# Patient Record
Sex: MALE | Race: WHITE | NOT HISPANIC OR LATINO | URBAN - METROPOLITAN AREA
[De-identification: names, ages, dates, MRNs, and addresses within clinical notes are randomized per-mention and may not be internally consistent; named-entity substitution may affect disease eponyms.]

---

## 2017-07-20 ENCOUNTER — EMERGENCY (EMERGENCY)
Facility: HOSPITAL | Age: 67
LOS: 1 days | End: 2017-07-20
Payer: COMMERCIAL

## 2017-07-20 PROCEDURE — 99284 EMERGENCY DEPT VISIT MOD MDM: CPT

## 2018-02-03 ENCOUNTER — EMERGENCY (EMERGENCY)
Facility: HOSPITAL | Age: 68
LOS: 1 days | End: 2018-02-03
Payer: COMMERCIAL

## 2018-02-03 PROCEDURE — 99283 EMERGENCY DEPT VISIT LOW MDM: CPT | Mod: 25

## 2018-02-03 PROCEDURE — 12001 RPR S/N/AX/GEN/TRNK 2.5CM/<: CPT

## 2023-04-03 PROBLEM — Z00.00 ENCOUNTER FOR PREVENTIVE HEALTH EXAMINATION: Status: ACTIVE | Noted: 2023-04-03

## 2023-04-13 ENCOUNTER — APPOINTMENT (OUTPATIENT)
Dept: INFECTIOUS DISEASE | Facility: CLINIC | Age: 73
End: 2023-04-13
Payer: COMMERCIAL

## 2023-04-13 VITALS
HEIGHT: 75 IN | DIASTOLIC BLOOD PRESSURE: 58 MMHG | SYSTOLIC BLOOD PRESSURE: 136 MMHG | TEMPERATURE: 98 F | WEIGHT: 247 LBS | OXYGEN SATURATION: 95 % | HEART RATE: 56 BPM | BODY MASS INDEX: 30.71 KG/M2

## 2023-04-13 DIAGNOSIS — H91.90 UNSPECIFIED HEARING LOSS, UNSPECIFIED EAR: ICD-10-CM

## 2023-04-13 DIAGNOSIS — K76.0 FATTY (CHANGE OF) LIVER, NOT ELSEWHERE CLASSIFIED: ICD-10-CM

## 2023-04-13 DIAGNOSIS — Z85.89 PERSONAL HISTORY OF MALIGNANT NEOPLASM OF OTHER ORGANS AND SYSTEMS: ICD-10-CM

## 2023-04-13 DIAGNOSIS — F32.A DEPRESSION, UNSPECIFIED: ICD-10-CM

## 2023-04-13 DIAGNOSIS — Z81.3 FAMILY HISTORY OF OTHER PSYCHOACTIVE SUBSTANCE ABUSE AND DEPENDENCE: ICD-10-CM

## 2023-04-13 DIAGNOSIS — Z77.22 CONTACT WITH AND (SUSPECTED) EXPOSURE TO ENVIRONMENTAL TOBACCO SMOKE (ACUTE) (CHRONIC): ICD-10-CM

## 2023-04-13 DIAGNOSIS — Z87.891 PERSONAL HISTORY OF NICOTINE DEPENDENCE: ICD-10-CM

## 2023-04-13 DIAGNOSIS — Z81.8 FAMILY HISTORY OF OTHER MENTAL AND BEHAVIORAL DISORDERS: ICD-10-CM

## 2023-04-13 DIAGNOSIS — M19.90 UNSPECIFIED OSTEOARTHRITIS, UNSPECIFIED SITE: ICD-10-CM

## 2023-04-13 DIAGNOSIS — Z78.9 OTHER SPECIFIED HEALTH STATUS: ICD-10-CM

## 2023-04-13 DIAGNOSIS — A49.9 BACTERIAL INFECTION, UNSPECIFIED: ICD-10-CM

## 2023-04-13 DIAGNOSIS — H60.509 UNSPECIFIED ACUTE NONINFECTIVE OTITIS EXTERNA, UNSPECIFIED EAR: ICD-10-CM

## 2023-04-13 DIAGNOSIS — Z85.828 PERSONAL HISTORY OF OTHER MALIGNANT NEOPLASM OF SKIN: ICD-10-CM

## 2023-04-13 DIAGNOSIS — Z83.3 FAMILY HISTORY OF DIABETES MELLITUS: ICD-10-CM

## 2023-04-13 DIAGNOSIS — G47.30 SLEEP APNEA, UNSPECIFIED: ICD-10-CM

## 2023-04-13 PROCEDURE — 99214 OFFICE O/P EST MOD 30 MIN: CPT

## 2023-04-13 NOTE — ASSESSMENT
[FreeTextEntry1] : This 72-year-old man with diabetes, who was admitted for otitis externa of the ear, with a polymicrobial infection.  At the hospital he was identified with Pseudomonas aeruginosa, pan susceptible.  He was discharged to home with a 7-day course of Levaquin.  Since leaving the hospital, review of the culture showed that there was also rare Klebsiella oxytoca.\par \par Clinically, he is resolved of his symptoms, and the Klebsiella oxytoca appears to be treated as well with the Levaquin. \par \par  Defer any more courses of antibiotics.\par \par \par I have advised the patient to follow-up with endocrinology.  He is trying to obtain an appointment with Dr. Hardeep Quiroga.  I advised him that it is a good idea.\par \par \par He can follow-up this office as needed.\par \par \par A copy of his cultures was provided to the patient.

## 2023-04-13 NOTE — PHYSICAL EXAM
[General Appearance - Alert] : alert [General Appearance - In No Acute Distress] : in no acute distress [Sclera] : the sclera and conjunctiva were normal [PERRL With Normal Accommodation] : pupils were equal in size, round, reactive to light [Extraocular Movements] : extraocular movements were intact [Outer Ear] : the ears and nose were normal in appearance [Oropharynx] : the oropharynx was normal with no thrush [FreeTextEntry1] : Left pinna with no significant erythema, there is no edema and no tenderness.  He has copious amount of hair on the tragus of his ear's bilaterally [Neck Appearance] : the appearance of the neck was normal [Neck Cervical Mass (___cm)] : no neck mass was observed [Thyroid Diffuse Enlargement] : the thyroid was not enlarged [Jugular Venous Distention Increased] : there was no jugular-venous distention [Auscultation Breath Sounds / Voice Sounds] : lungs were clear to auscultation bilaterally [Heart Rate And Rhythm] : heart rate was normal and rhythm regular [Heart Sounds] : normal S1 and S2 [Murmurs] : no murmurs [Heart Sounds Gallop] : no gallops [Heart Sounds Pericardial Friction Rub] : no pericardial rub [Full Pulse] : the pedal pulses are present [Edema] : there was no peripheral edema [Bowel Sounds] : normal bowel sounds [Abdomen Soft] : soft [Abdomen Tenderness] : non-tender [Abdomen Mass (___ Cm)] : no abdominal mass palpated [Costovertebral Angle Tenderness] : no CVA tenderness [No Palpable Adenopathy] : no palpable adenopathy [Musculoskeletal - Swelling] : no joint swelling [Nail Clubbing] : no clubbing  or cyanosis of the fingernails [Motor Tone] : muscle strength and tone were normal [Skin Color & Pigmentation] : normal skin color and pigmentation [] : no rash [Cranial Nerves] : cranial nerves 2-12 were intact [Sensation] : the sensory exam was normal to light touch and pinprick [No Focal Deficits] : no focal deficits [Oriented To Time, Place, And Person] : oriented to person, place, and time [Affect] : the affect was normal

## 2023-04-13 NOTE — DATA REVIEWED
[FreeTextEntry1] : \par \par \par \par \par WOUND CULTURE      \par \par Previous Result Next Result \par \par \par      Order Start Date & Time  03- 00:19  Result Date & Time    04- 08:13  \par     Ordering Clinician  RYAN NAVARRO  Result Status  Final  \par     Specimen  Wound swab  Last Updated At  [facility]  \par     Observation Date & Time  03- 18:30  Lab Number  [id number]  \par     Specimen Received Date & Time    03- 18:30  Order Number  [id number]  \par        Age at Time of Test  72 Years  \par \par \par  \par \par \par Test Item\par \par Value\par \par Comments\par \par Test Item Status\par \par Sensitivities\par \par \par  Culture - Wound \par Pseudomonas aeruginosa~Reference Lab Flag: ABNORMAL~Klebsiella oxytoca /Raoutella ornithinolytica~Reference Lab Flag: ABNORMAL~Performing Site: [facility]~[address] [place] [address]~Bill Bae MD\par Pseudomonas aeruginosa\par Numerous\par Klebsiella oxytoca\par Rare     Final   \par  ORGANISM \par Pseudomonas aeruginosa       Yes \par  ORGANISM \par Klebsiella oxytoca/Raoutella orinthinolytica         \par  \par  \par \par \par  \par  \par \par \par \par \par  \par \par Pseudomonas aeruginosa\par \par \par  \par \par Bacterial susceptibility panel by CURT\par \par \par \par Amikacin\par \par S (<= 16) \par \par \par Amoxicillin/Clavulanic Acid\par \par S (<= 8 / 4) \par \par \par Ampicillin\par \par R (> 16) \par \par \par Ampicillin/Sulbactam\par \par S ( 8 / 4) \par \par \par Aztreonam\par \par S (<= 4) \par \par \par Cefazolin\par \par R ( 16) \par \par \par Cefepime\par \par S (<= 2) \par \par \par Cefoxitin\par \par S (<= 8) \par \par \par Ceftriaxone\par \par S (<= 1) \par \par \par Ciprofloxacin\par \par S (<= 0.25) \par \par \par Ertapenem\par \par S (<= 0.5) \par \par \par Gentamicin\par \par S (<= 2) \par \par \par Imipenem\par \par S (<= 1) \par \par \par Levofloxacin\par \par S (<= 0.5) \par \par \par Meropenem\par \par S (<= 1) \par \par \par Piperacillin/Tazobactam\par \par S (<= 8) \par \par \par Tobramycin\par \par S (<= 2) \par \par \par Trimethoprim/Sulfamethoxazole\par \par S (<= 0.5 / 9.5) \par \par  \par     \par Test Set Comments:\par Pus from left ear canal (recent surgery)\par Numerous Pseudomonas aeruginosa Rare Klebsiella oxytoca/Raoultella\par ornithinolytica\par Pus from left ear canal (recent surgery)   \par

## 2023-04-13 NOTE — HISTORY OF PRESENT ILLNESS
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  He was admitted to the hospital  on March 28, 2023 due to concern of possible malignant otitis externa.\par \par His labs show mild WBC elevation.  Cultures from the ear grew out Pseudomonas aeruginosa, pan susceptible.  Labs from the hospital showed that he had a A1c was 7.7. \par \par He was recommended to be discharged home with Levaquin 750 mg p.o. daily for 7 days.\par \par Cultures were reviewed and printed out at the office today.  Final results from the culture also grew out Klebsiella oxytoca.\par \par Clinically, he feels well.  He has no more ear pain.  There is no drainage.  He had follow-up with the surgeon who did the surgery from his ear, and everything looks well.

## 2023-04-18 PROBLEM — Z85.89 HISTORY OF SQUAMOUS CELL CARCINOMA: Status: ACTIVE | Noted: 2023-04-13

## 2023-04-18 PROBLEM — K76.0 FATTY LIVER: Status: ACTIVE | Noted: 2023-04-13

## 2023-04-18 PROBLEM — G47.30 SLEEP APNEA, UNSPECIFIED TYPE: Status: ACTIVE | Noted: 2023-04-13

## 2023-04-18 PROBLEM — F32.A DEPRESSION: Status: ACTIVE | Noted: 2023-04-13

## 2023-04-18 PROBLEM — M19.90 ARTHRITIS: Status: ACTIVE | Noted: 2023-04-13

## 2023-04-18 PROBLEM — Z87.891 FORMER SMOKER: Status: ACTIVE | Noted: 2023-04-13

## 2023-04-18 PROBLEM — Z78.9 CONSUMES ALCOHOL OCCASIONALLY: Status: ACTIVE | Noted: 2023-04-18

## 2023-04-18 PROBLEM — H91.90 HOH (HARD OF HEARING): Status: ACTIVE | Noted: 2023-04-13

## 2023-04-18 PROBLEM — Z85.828 HISTORY OF BASAL CELL CARCINOMA: Status: ACTIVE | Noted: 2023-04-13

## 2023-04-18 PROBLEM — Z77.22 SECONDHAND SMOKE EXPOSURE: Status: ACTIVE | Noted: 2023-04-18

## 2023-04-18 PROBLEM — Z83.3 FAMILY HISTORY OF DIABETES MELLITUS: Status: ACTIVE | Noted: 2023-04-18

## 2023-04-18 RX ORDER — CARVEDILOL 12.5 MG/1
12.5 TABLET, FILM COATED ORAL TWICE DAILY
Refills: 0 | Status: ACTIVE | COMMUNITY

## 2023-04-18 RX ORDER — FAMOTIDINE 10 MG/1
TABLET, FILM COATED ORAL DAILY
Refills: 0 | Status: ACTIVE | COMMUNITY

## 2023-04-18 RX ORDER — AMLODIPINE BESYLATE 5 MG/1
5 TABLET ORAL DAILY
Refills: 0 | Status: ACTIVE | COMMUNITY

## 2023-04-18 RX ORDER — PAROXETINE HYDROCHLORIDE 40 MG/1
40 TABLET, FILM COATED ORAL DAILY
Refills: 0 | Status: ACTIVE | COMMUNITY

## 2023-04-18 RX ORDER — CYCLOBENZAPRINE HYDROCHLORIDE 10 MG/1
10 TABLET, FILM COATED ORAL
Refills: 0 | Status: ACTIVE | COMMUNITY

## 2023-04-18 RX ORDER — ASCORBIC ACID 500 MG
TABLET ORAL
Refills: 0 | Status: ACTIVE | COMMUNITY

## 2023-04-18 RX ORDER — ATORVASTATIN CALCIUM 20 MG/1
20 TABLET, FILM COATED ORAL DAILY
Refills: 0 | Status: ACTIVE | COMMUNITY

## 2023-04-18 RX ORDER — FENOFIBRATE 145 MG/1
145 TABLET, COATED ORAL DAILY
Refills: 0 | Status: ACTIVE | COMMUNITY

## 2023-04-18 RX ORDER — LACTOBACILLUS RHAMNOSUS GG 10B CELL
CAPSULE ORAL
Refills: 0 | Status: ACTIVE | COMMUNITY

## 2023-04-18 RX ORDER — ICOSAPENT ETHYL 1000 MG/1
1 CAPSULE ORAL
Refills: 0 | Status: ACTIVE | COMMUNITY

## 2023-04-18 RX ORDER — FLUTICASONE PROPIONATE 50 MCG
SPRAY, SUSPENSION NASAL 3 TIMES DAILY
Refills: 0 | Status: ACTIVE | COMMUNITY

## 2023-04-18 RX ORDER — VITAMIN E ACID SUCCINATE 268 MG
TABLET ORAL
Refills: 0 | Status: ACTIVE | COMMUNITY

## 2023-05-03 ENCOUNTER — APPOINTMENT (OUTPATIENT)
Dept: INFECTIOUS DISEASE | Facility: CLINIC | Age: 73
End: 2023-05-03
Payer: COMMERCIAL

## 2023-05-03 VITALS
DIASTOLIC BLOOD PRESSURE: 60 MMHG | HEART RATE: 54 BPM | BODY MASS INDEX: 31.58 KG/M2 | TEMPERATURE: 98.3 F | WEIGHT: 254 LBS | HEIGHT: 75 IN | SYSTOLIC BLOOD PRESSURE: 140 MMHG | OXYGEN SATURATION: 98 %

## 2023-05-03 DIAGNOSIS — A49.8 OTHER BACTERIAL INFECTIONS OF UNSPECIFIED SITE: ICD-10-CM

## 2023-05-03 DIAGNOSIS — H60.90 UNSPECIFIED OTITIS EXTERNA, UNSPECIFIED EAR: ICD-10-CM

## 2023-05-03 PROCEDURE — 99215 OFFICE O/P EST HI 40 MIN: CPT | Mod: 25

## 2023-05-03 NOTE — HISTORY OF PRESENT ILLNESS
[FreeTextEntry1] : Presents today saying that he is having increasing discharge and pain from his ear with pus coming out and sensitivity in the area.  He is not having any fevers or chills.  Today [birth year] his birthday.  He says after finishing the Levaquin he went to the [facility] to see his internist and his internist put him on Augmentin which she took for 2 weeks without any clinical improvement.  So he is here now to follow-up.  I discussed with him that his prior cultures had Pseudomonas growth from them and that Augmentin would not be sufficient to treat that we will restart Levaquin and I reswabbed the drainage for culture.\par \par Seen by Dr Joshua  in office April 13 his note as below \par  72-year-old man with diabetes, that is in poor control, with glucose range from 80s to 200, who was last seen at the hospital March 31, 2023.\par He had a recent excision of skin cancer from his left ear, then developed increasing ear pain difficulty hearing, and drainage from his ear. He was admitted to the hospital on March 28, 2023 due to concern of possible malignant otitis externa.\par \par His labs show mild WBC elevation. Cultures from the ear grew out Pseudomonas aeruginosa, pan susceptible. Labs from the hospital showed that he had a A1c was 7.7. \par \par He was recommended to be discharged home with Levaquin 750 mg p.o. daily for 7 days.\par \par Cultures were reviewed and printed out at the office today. Final results from the culture also grew out Klebsiella oxytoca.\par \par Clinically, he feels well. He has no more ear pain. There is no drainage. He had follow-up with the surgeon who did the surgery from his ear, and everything looks well.

## 2023-05-03 NOTE — DATA REVIEWED
[FreeTextEntry1] : \par WOUND CULTURE \par \par Previous Result Next Result \par \par \par  Order Start Date & Time 03- 00:19 Result Date & Time 04- 08:13 \par  Ordering Clinician RYAN NAVARRO Result Status Final \par  Specimen Wound swab Last Updated At [facility] \par  Observation Date & Time 03- 18:30 Lab Number [id number] \par  Specimen Received Date & Time 03- 18:30 Order Number [id number] \par  Age at Time of Test 72 Years \par \par \par  \par \par \par Test Item\par \par Value\par \par Comments\par \par Test Item Status\par \par Sensitivities\par \par \par  Culture - Wound \par Pseudomonas aeruginosaeference Lab Flag: ABNORMAL~Klebsiella oxytoca /Raoutella ornithinolyticaeference Lab Flag: ABNORMALerforming Site: [facility]~[address] [place] [address]~Bill Bae MD\par Pseudomonas aeruginosa\par Numerous\par Klebsiella oxytoca\par Rare Final \par  ORGANISM \par Pseudomonas aeruginosa Yes \par  ORGANISM \par Klebsiella oxytoca/Raoutella orinthinolytica \par  \par  \par \par \par  \par  \par \par \par \par \par  \par \par Pseudomonas aeruginosa\par \par \par  \par \par Bacterial susceptibility panel by CURT\par \par \par \par Amikacin\par \par S (<= 16) \par \par \par Amoxicillin/Clavulanic Acid\par \par S (<= 8 / 4) \par \par \par Ampicillin\par \par R (> 16) \par \par \par Ampicillin/Sulbactam\par \par S ( 8 / 4) \par \par \par Aztreonam\par \par S (<= 4) \par \par \par Cefazolin\par \par R ( 16) \par \par \par Cefepime\par \par S (<= 2) \par \par \par Cefoxitin\par \par S (<= 8) \par \par \par Ceftriaxone\par \par S (<= 1) \par \par \par Ciprofloxacin\par \par S (<= 0.25) \par \par \par Ertapenem\par \par S (<= 0.5) \par \par \par Gentamicin\par \par S (<= 2) \par \par \par Imipenem\par \par S (<= 1) \par \par \par Levofloxacin\par \par S (<= 0.5) \par \par \par Meropenem\par \par S (<= 1) \par \par \par Piperacillin/Tazobactam\par \par S (<= 8) \par \par \par Tobramycin\par \par S (<= 2) \par \par \par Trimethoprim/Sulfamethoxazole\par \par S (<= 0.5 / 9.5) \par \par  \par  \par Test Set Comments:\par Pus from left ear canal (recent surgery)\par Numerous Pseudomonas aeruginosa Rare Klebsiella oxytoca/Raoultella\par ornithinolytica\par Pus from left ear canal (recent surgery) \par . \par \par

## 2023-05-03 NOTE — ASSESSMENT
[FreeTextEntry1] : 72-year-old-year-old male with diabetes and recent ear surgery for a carcinoma complicated by otitis externa with Pseudomonas aeruginosa and Klebsiella oxytoca completed a course of treatment 7 days with levofloxacin and then subsequently went to his internist who prescribed 2 weeks of Augmentin.  He had no clinical improvement on the Augmentin and has actively draining pus from the ear with tenderness and pain.\par \par #Otitis externa Pseudomonas aeruginosa and Klebsiella oxytoca, recurrent\par – Collected an office culture of active pus drainage will send to lab\par – Ordered levofloxacin 750 daily for 10-day course he should follow-up in office next week to assess if there is any improvement if there is not further imaging may be needed due to the pain and tenderness in that area\par \par #Diabetes\par -Optimize blood sugar control to aid in healing [Treatment Education] : treatment education [Treatment Adherence] : treatment adherence [Medical Care Issues] : medical care issues [Drug Interactions / Side Effects] : drug interactions/side effects [Disclosure of Diagnosis] : disclosure of diagnosis [Anticipatory Guidance] : anticipatory guidance

## 2023-05-03 NOTE — PHYSICAL EXAM
[General Appearance - Alert] : alert [General Appearance - In No Acute Distress] : in no acute distress [Extraocular Movements] : extraocular movements were intact [FreeTextEntry1] : L ear with outward growth and active pus drainage, tender to touch, mild palpable LN [Edema] : there was no peripheral edema [Abdomen Soft] : soft [Musculoskeletal - Swelling] : no joint swelling [Skin Color & Pigmentation] : normal skin color and pigmentation [] : no rash [No Focal Deficits] : no focal deficits

## 2023-05-10 ENCOUNTER — APPOINTMENT (OUTPATIENT)
Dept: INFECTIOUS DISEASE | Facility: CLINIC | Age: 73
End: 2023-05-10
Payer: COMMERCIAL

## 2023-05-10 VITALS
DIASTOLIC BLOOD PRESSURE: 62 MMHG | TEMPERATURE: 98 F | BODY MASS INDEX: 30.84 KG/M2 | HEART RATE: 61 BPM | WEIGHT: 248 LBS | HEIGHT: 75 IN | OXYGEN SATURATION: 96 % | RESPIRATION RATE: 18 BRPM | SYSTOLIC BLOOD PRESSURE: 130 MMHG

## 2023-05-10 PROCEDURE — 99215 OFFICE O/P EST HI 40 MIN: CPT

## 2023-05-10 NOTE — PHYSICAL EXAM
[General Appearance - Alert] : alert [General Appearance - In No Acute Distress] : in no acute distress [Extraocular Movements] : extraocular movements were intact [FreeTextEntry1] : L ear with outward growth and minimal pus, tender to touch less than prior but still pain with pinna manipulation, mild palpable LN [Edema] : there was no peripheral edema [Abdomen Soft] : soft [Musculoskeletal - Swelling] : no joint swelling [Skin Color & Pigmentation] : normal skin color and pigmentation [] : no rash [No Focal Deficits] : no focal deficits

## 2023-05-10 NOTE — HISTORY OF PRESENT ILLNESS
[FreeTextEntry1] : Seen today.  Remains on levofloxacin, does not describe any side effects from this.  He says the ear feels better there is still drainage but it has decreased and the pain and sensitivity he was feeling has also decreased but is not yet resolved he still feels some.  No fevers no chills no muscle aches no tendon pain.  There is still pain to palpation behind the ear over the bone, discussed with him we will increase antibiotic duration due to this.  He was in agreement.\par \par Last seen 5/3/23 note from prior visit:\par Said that he is having increasing discharge and pain from his ear with pus coming out and sensitivity in the area.  He is not having any fevers or chills.  Today is his birthday.  He says after finishing the Levaquin he went to the [facility] to see his internist and his internist put him on Augmentin which she took for 2 weeks without any clinical improvement.  So he is here now to follow-up.  I discussed with him that his prior cultures had Pseudomonas growth from them and that Augmentin would not be sufficient to treat that we will restart Levaquin and I reswabbed the drainage for culture.\par \par Seen by Dr Joshua  in office April 13 his note as below \par  72-year-old man with diabetes, that is in poor control, with glucose range from 80s to 200, who was last seen at the hospital March 31, 2023.\par He had a recent excision of skin cancer from his left ear, then developed increasing ear pain difficulty hearing, and drainage from his ear. He was admitted to the hospital on March 28, 2023 due to concern of possible malignant otitis externa.\par \par His labs show mild WBC elevation. Cultures from the ear grew out Pseudomonas aeruginosa, pan susceptible. Labs from the hospital showed that he had a A1c was 7.7. \par \par He was recommended to be discharged home with Levaquin 750 mg p.o. daily for 7 days.\par \par Cultures were reviewed and printed out at the office today. Final results from the culture also grew out Klebsiella oxytoca.\par \par Clinically, he feels well. He has no more ear pain. There is no drainage. He had follow-up with the surgeon who did the surgery from his ear, and everything looks well.

## 2023-05-10 NOTE — DATA REVIEWED
[FreeTextEntry1] : 5/3/23 Ear culture\par Few Pseudomonas aeruginosa along with normal respiratory chip susceptibilities of Pseudomonas was susceptible to all tested antibiotics including levofloxacin and ciprofloxacin

## 2023-05-10 NOTE — REVIEW OF SYSTEMS
[Fever] : no fever [Chills] : no chills [Earache] : earache [Palpitations] : no palpitations [Shortness Of Breath] : no shortness of breath [Abdominal Pain] : no abdominal pain [Dysuria] : no dysuria [Joint Pain] : no joint pain [Skin Lesions] : no skin lesions [Confused] : no confusion [FreeTextEntry4] : ear discharge

## 2023-05-10 NOTE — ASSESSMENT
[FreeTextEntry1] : 72-year-old-year-old male with diabetes and recent ear surgery for a carcinoma complicated by otitis externa with Pseudomonas aeruginosa and Klebsiella oxytoca completed a course of treatment 7 days with levofloxacin and then subsequently went to his internist who prescribed 2 weeks of Augmentin. He had no clinical improvement on the Augmentin and had actively draining pus from the ear with tenderness and pain, repeat cultures 5/3/2023 collected in office grew Pseudomonas aeruginosa pan susceptible along with normal respiratory chip.  He was restarted on levofloxacin on 5 3 and remains on this, discharge from ear has decreased but not resolved and pain and tenderness around the ear has decreased but not yet resolved.  Due to this will extend antibiotic course.\par \par #Otitis externa Pseudomonas aeruginosa and Klebsiella oxytoca, recurrent\par \par –Extend course of levofloxacin 750 daily for additional 7 days, initially prescribed for 5/3-5/12/23, now now will be extended by a week  he should follow-up in office in 2 weeks to ensure resolution if there is not further imaging may be needed due to the pain and tenderness in that area\par -Discussed with him levofloxacin can cause tendinitis tendon rupture and muscle pain, and that if he develops any tendon pain or muscle pain he should contact the office immediately, discussed with him that fluoroquinolones are the only oral antibiotic choice for Pseudomonas so that is why we have to use it if he develops the symptoms we will need to treat with IV antibiotics\par \par \par #Diabetes\par -Optimize blood sugar control to aid in healing. \par \par Patient requested primary care referral as he has recently moved here from [place] referrals provided by [facility]\par \par  [Treatment Education] : treatment education [Treatment Adherence] : treatment adherence [Medical Care Issues] : medical care issues [Drug Interactions / Side Effects] : drug interactions/side effects [Disclosure of Diagnosis] : disclosure of diagnosis

## 2023-05-17 LAB — EAR NOSE AND THROAT CULTURE: ABNORMAL

## 2023-05-31 ENCOUNTER — APPOINTMENT (OUTPATIENT)
Dept: INFECTIOUS DISEASE | Facility: CLINIC | Age: 73
End: 2023-05-31

## 2023-07-13 ENCOUNTER — APPOINTMENT (OUTPATIENT)
Dept: INFECTIOUS DISEASE | Facility: CLINIC | Age: 73
End: 2023-07-13
Payer: COMMERCIAL

## 2023-07-13 VITALS
HEART RATE: 50 BPM | TEMPERATURE: 97.9 F | DIASTOLIC BLOOD PRESSURE: 68 MMHG | OXYGEN SATURATION: 99 % | WEIGHT: 242 LBS | SYSTOLIC BLOOD PRESSURE: 140 MMHG | HEIGHT: 75 IN | BODY MASS INDEX: 30.09 KG/M2

## 2023-07-13 DIAGNOSIS — L53.9 ERYTHEMATOUS CONDITION, UNSPECIFIED: ICD-10-CM

## 2023-07-13 DIAGNOSIS — E11.9 TYPE 2 DIABETES MELLITUS W/OUT COMPLICATIONS: ICD-10-CM

## 2023-07-13 PROCEDURE — 99213 OFFICE O/P EST LOW 20 MIN: CPT

## 2023-07-13 RX ORDER — LEVOFLOXACIN 750 MG/1
750 TABLET, FILM COATED ORAL DAILY
Qty: 7 | Refills: 0 | Status: DISCONTINUED | COMMUNITY
Start: 2023-05-10 | End: 2023-07-13

## 2023-07-13 RX ORDER — LEVOFLOXACIN 750 MG/1
750 TABLET, FILM COATED ORAL DAILY
Qty: 10 | Refills: 0 | Status: DISCONTINUED | COMMUNITY
Start: 2023-05-03 | End: 2023-07-13

## 2023-07-13 NOTE — HISTORY OF PRESENT ILLNESS
[FreeTextEntry1] : This 73-year-old gentleman with diabetes, who had been seen in April for a left ear infection/otitis externa which grew out susceptible Pseudomonas and also Klebsiella oxytoca..  He was treated with course of IV antibiotics followed by oral Levaquin and discharged to the community.\par \par He had a follow-up this office with Dr. Pop on May 3, 2023.  This was because after the hospital course he went to his internist for drainage of pus from his ear, who prescribed 2 weeks of Augmentin with no improvement and he was still draining pus.  At that appointment he was given 10 more days of Levaquin 750 mg daily.  A culture sent from the office marked erroneously as ear nose and throat culture, grew out Pseudomonas aeruginosa susceptible to all tested agents.  The patient had another follow-up 1 week later on May 10, 2023, his course of antibiotics was extended by another 7 days.\par \par \par Today he is here for follow-up.  He reports that he went to his ENT doctor, concern for some irritation in his left outer ear.  The ENT examined it, and told that he has some scar tissue.  He had been applying bacitracin to the ear since the May visit.\par

## 2023-07-13 NOTE — ASSESSMENT
[FreeTextEntry1] : This 73-year-old man who has diabetes, been treated with a prolonged course of antibiotics for Pseudomonas infection of the ear.  Last treatment course completed around middle of May 2023.\par \par He is here for redness and irritation of the left ear, specifically the dorota area.\par \par Examination suggest that this might be irritation and inflammation from the use of the bacitracin.\par \par No evidence of deeper infection.  No pus drainage.\par \par I suggest for the patient to stop use of bacitracin immediately.\par \par He can follow-up closely with his primary care doctor and his ENT doctor.  He can follow-up this office as needed.

## 2023-07-13 NOTE — PHYSICAL EXAM
[General Appearance - Alert] : alert [General Appearance - In No Acute Distress] : in no acute distress [Sclera] : the sclera and conjunctiva were normal [PERRL With Normal Accommodation] : pupils were equal in size, round, reactive to light [Extraocular Movements] : extraocular movements were intact [Outer Ear] : the ears and nose were normal in appearance [Oropharynx] : the oropharynx was normal with no thrush [FreeTextEntry1] : Left pinna with no significant erythema, there is no tenderness on traction of the earlobe.  There is erythema and flaking of the skin in the dorota of the left ear. [Neck Appearance] : the appearance of the neck was normal [Neck Cervical Mass (___cm)] : no neck mass was observed [Jugular Venous Distention Increased] : there was no jugular-venous distention [Thyroid Diffuse Enlargement] : the thyroid was not enlarged [Auscultation Breath Sounds / Voice Sounds] : lungs were clear to auscultation bilaterally [Heart Rate And Rhythm] : heart rate was normal and rhythm regular [Heart Sounds] : normal S1 and S2 [Heart Sounds Gallop] : no gallops [Murmurs] : no murmurs [Heart Sounds Pericardial Friction Rub] : no pericardial rub [Full Pulse] : the pedal pulses are present [Edema] : there was no peripheral edema [Bowel Sounds] : normal bowel sounds [Abdomen Soft] : soft [Abdomen Tenderness] : non-tender [Abdomen Mass (___ Cm)] : no abdominal mass palpated [Costovertebral Angle Tenderness] : no CVA tenderness [No Palpable Adenopathy] : no palpable adenopathy [Musculoskeletal - Swelling] : no joint swelling [Nail Clubbing] : no clubbing  or cyanosis of the fingernails [Motor Tone] : muscle strength and tone were normal [Skin Color & Pigmentation] : normal skin color and pigmentation [] : no rash [Cranial Nerves] : cranial nerves 2-12 were intact [Sensation] : the sensory exam was normal to light touch and pinprick [No Focal Deficits] : no focal deficits [Oriented To Time, Place, And Person] : oriented to person, place, and time [Affect] : the affect was normal

## 2023-09-11 ENCOUNTER — APPOINTMENT (OUTPATIENT)
Dept: ENDOCRINOLOGY | Facility: CLINIC | Age: 73
End: 2023-09-11
Payer: COMMERCIAL

## 2023-09-11 VITALS
OXYGEN SATURATION: 98 % | HEIGHT: 74 IN | HEART RATE: 60 BPM | RESPIRATION RATE: 18 BRPM | WEIGHT: 245 LBS | DIASTOLIC BLOOD PRESSURE: 70 MMHG | SYSTOLIC BLOOD PRESSURE: 140 MMHG | BODY MASS INDEX: 31.44 KG/M2 | TEMPERATURE: 97.4 F

## 2023-09-11 LAB — GLUCOSE BLDC GLUCOMTR-MCNC: 108

## 2023-09-11 PROCEDURE — 99214 OFFICE O/P EST MOD 30 MIN: CPT | Mod: 25

## 2023-09-11 PROCEDURE — 82962 GLUCOSE BLOOD TEST: CPT

## 2023-09-11 RX ORDER — INSULIN GLARGINE 100 [IU]/ML
INJECTION, SOLUTION SUBCUTANEOUS
Refills: 0 | Status: DISCONTINUED | COMMUNITY
End: 2023-09-11

## 2023-09-11 RX ORDER — INSULIN LISPRO 100 [IU]/ML
INJECTION, SOLUTION INTRAVENOUS; SUBCUTANEOUS
Refills: 0 | Status: DISCONTINUED | COMMUNITY
End: 2023-09-11

## 2023-09-11 RX ORDER — LEVOTHYROXINE SODIUM 0.15 MG/1
150 TABLET ORAL
Qty: 90 | Refills: 1 | Status: ACTIVE | COMMUNITY
Start: 1900-01-01 | End: 1900-01-01

## 2023-09-11 RX ORDER — LEVOTHYROXINE SODIUM 100 UG/ML
INJECTION, SOLUTION INTRAVENOUS
Refills: 0 | Status: DISCONTINUED | COMMUNITY
End: 2023-09-11

## 2023-09-21 RX ORDER — BLOOD-GLUCOSE,RECEIVER,CONT
EACH MISCELLANEOUS
Qty: 1 | Refills: 0 | Status: ACTIVE | COMMUNITY
Start: 2023-09-21 | End: 1900-01-01

## 2023-10-19 RX ORDER — SEMAGLUTIDE 0.68 MG/ML
2 INJECTION, SOLUTION SUBCUTANEOUS
Qty: 3 | Refills: 1 | Status: ACTIVE | COMMUNITY
Start: 2023-09-11

## 2023-11-01 ENCOUNTER — APPOINTMENT (OUTPATIENT)
Dept: ENDOCRINOLOGY | Facility: CLINIC | Age: 73
End: 2023-11-01
Payer: COMMERCIAL

## 2023-11-01 PROCEDURE — ZZZZZ: CPT

## 2023-11-20 ENCOUNTER — NON-APPOINTMENT (OUTPATIENT)
Age: 73
End: 2023-11-20

## 2024-02-14 ENCOUNTER — APPOINTMENT (OUTPATIENT)
Dept: ENDOCRINOLOGY | Facility: CLINIC | Age: 74
End: 2024-02-14
Payer: COMMERCIAL

## 2024-02-14 VITALS
RESPIRATION RATE: 18 BRPM | OXYGEN SATURATION: 99 % | TEMPERATURE: 97 F | SYSTOLIC BLOOD PRESSURE: 140 MMHG | DIASTOLIC BLOOD PRESSURE: 70 MMHG | BODY MASS INDEX: 30.16 KG/M2 | HEIGHT: 74 IN | HEART RATE: 60 BPM | WEIGHT: 235 LBS

## 2024-02-14 DIAGNOSIS — E03.9 HYPOTHYROIDISM, UNSPECIFIED: ICD-10-CM

## 2024-02-14 DIAGNOSIS — E11.65 TYPE 2 DIABETES MELLITUS WITH HYPERGLYCEMIA: ICD-10-CM

## 2024-02-14 DIAGNOSIS — E78.5 HYPERLIPIDEMIA, UNSPECIFIED: ICD-10-CM

## 2024-02-14 DIAGNOSIS — I10 ESSENTIAL (PRIMARY) HYPERTENSION: ICD-10-CM

## 2024-02-14 PROCEDURE — 95251 CONT GLUC MNTR ANALYSIS I&R: CPT

## 2024-02-14 PROCEDURE — 99214 OFFICE O/P EST MOD 30 MIN: CPT

## 2024-02-14 RX ORDER — PEN NEEDLE, DIABETIC 32GX 5/32"
32G X 4 MM NEEDLE, DISPOSABLE MISCELLANEOUS
Qty: 400 | Refills: 3 | Status: ACTIVE | COMMUNITY
Start: 2024-02-14 | End: 1900-01-01

## 2024-02-14 NOTE — HISTORY OF PRESENT ILLNESS
[FreeTextEntry1] : Follow up of DM and hypothyroidism  Was seen at [facility] during admission in March 2023 when he was admitted with left ear infection, which occurred after resection of skin cancer, depression, HT, HLD. Patient is a [place] .   Quality:  Type 2 DM Severity:  moderate Duration of diabetes:  about 40 years Associated Complications/ Symptoms: Neuropathy Modifying Factors:  Better with medication  SMBG: Testing up to 4 times a day. Currently using Dexcom G7 CGM:  Average BG is 169 mg/dl with SD of 52.  64% of BG within target range, 0% below range and 36% above range.    Current Diabetic Medication Regimen: Lantus 100 units qhs Humalog 20 units with meals  Jardiance 10 mg daily Did not yet start Ozempic.   Was intolerant to metformin in the past.    Has lost some weight.

## 2024-02-14 NOTE — ASSESSMENT
[FreeTextEntry1] : 73 year old male with Type 2 DM complicated by neuropathy, HTN, HLD and hypothyroidism here for follow up of his diabetes.  His diabetes control is improving.     1. Type 2 DM- check labs now.  Will start Ozempic 0.25 mg qweek for 4 weeks, then increase to 0.5 mg qweek.  When starting Ozempic, will reduce lantus to 80 units qhs and reduce Humalog to 10 units 3XD with meals.    CGM medical necessity statement: Patient tests his blood glucose 4 or more times a day and injects insulin 3 or more times per day. He is seen regularly at this office within 6 month intervals. Patient requires frequent adjustments to his insulin regimen on the basis of CGM results.  2. Hypothyroidism- continue LT4. Check TFTs now. 3. HLD- continue statin, fibrate and Vascepa. Check lipids now.  Will determine if Fenofibrate is still necessary based on results.   4. HTN- would benefit from addition of ARB in the future.  Follow up in 3 months.

## 2024-03-22 RX ORDER — BLOOD-GLUCOSE SENSOR
EACH MISCELLANEOUS
Qty: 9 | Refills: 1 | Status: ACTIVE | COMMUNITY
Start: 2023-09-21 | End: 1900-01-01

## 2024-03-27 ENCOUNTER — LABORATORY RESULT (OUTPATIENT)
Age: 74
End: 2024-03-27

## 2024-03-28 RX ORDER — BLOOD-GLUCOSE METER
W/DEVICE EACH MISCELLANEOUS
Qty: 1 | Refills: 0 | Status: ACTIVE | COMMUNITY
Start: 2024-03-28 | End: 1900-01-01

## 2024-03-28 RX ORDER — LANCETS 30 GAUGE
EACH MISCELLANEOUS
Qty: 100 | Refills: 3 | Status: ACTIVE | COMMUNITY
Start: 2024-03-28 | End: 1900-01-01

## 2024-03-28 RX ORDER — BLOOD SUGAR DIAGNOSTIC
STRIP MISCELLANEOUS
Qty: 100 | Refills: 1 | Status: ACTIVE | COMMUNITY
Start: 2024-03-28 | End: 1900-01-01

## 2024-04-02 LAB
ALBUMIN SERPL ELPH-MCNC: 4.9 G/DL
ALP BLD-CCNC: 53 U/L
ALT SERPL-CCNC: 28 U/L
ANION GAP SERPL CALC-SCNC: 13 MMOL/L
AST SERPL-CCNC: 23 U/L
BASOPHILS # BLD AUTO: 0.09 K/UL
BASOPHILS NFR BLD AUTO: 1.1 %
BILIRUB SERPL-MCNC: 0.5 MG/DL
BUN SERPL-MCNC: 16 MG/DL
C PEPTIDE SERPL-MCNC: 0.8 NG/ML
CALCIUM SERPL-MCNC: 10 MG/DL
CHLORIDE SERPL-SCNC: 103 MMOL/L
CHOLEST SERPL-MCNC: 166 MG/DL
CO2 SERPL-SCNC: 26 MMOL/L
CREAT SERPL-MCNC: 0.84 MG/DL
CREAT SPEC-SCNC: 86 MG/DL
EGFR: 92 ML/MIN/1.73M2
EOSINOPHIL # BLD AUTO: 0.42 K/UL
EOSINOPHIL NFR BLD AUTO: 5.1 %
ESTIMATED AVERAGE GLUCOSE: 174 MG/DL
GLUCOSE SERPL-MCNC: 156 MG/DL
HBA1C MFR BLD HPLC: 7.7 %
HCT VFR BLD CALC: 40.6 %
HDLC SERPL-MCNC: 33 MG/DL
HGB BLD-MCNC: 12.8 G/DL
IMM GRANULOCYTES NFR BLD AUTO: 0.2 %
LDLC SERPL CALC-MCNC: 83 MG/DL
LYMPHOCYTES # BLD AUTO: 2.74 K/UL
LYMPHOCYTES NFR BLD AUTO: 33.5 %
MAN DIFF?: NORMAL
MCHC RBC-ENTMCNC: 21.4 PG
MCHC RBC-ENTMCNC: 31.5 GM/DL
MCV RBC AUTO: 67.8 FL
MICROALBUMIN 24H UR DL<=1MG/L-MCNC: <1.2 MG/DL
MICROALBUMIN/CREAT 24H UR-RTO: NORMAL MG/G
MONOCYTES # BLD AUTO: 0.52 K/UL
MONOCYTES NFR BLD AUTO: 6.3 %
NEUTROPHILS # BLD AUTO: 4.4 K/UL
NEUTROPHILS NFR BLD AUTO: 53.8 %
NONHDLC SERPL-MCNC: 133 MG/DL
PLATELET # BLD AUTO: 339 K/UL
POTASSIUM SERPL-SCNC: 4.3 MMOL/L
PROT SERPL-MCNC: 8.1 G/DL
RBC # BLD: 5.99 M/UL
RBC # FLD: 17.4 %
SODIUM SERPL-SCNC: 141 MMOL/L
TRIGL SERPL-MCNC: 308 MG/DL
TSH SERPL-ACNC: 1.65 UIU/ML
WBC # FLD AUTO: 8.19 K/UL

## 2024-04-15 RX ORDER — INSULIN LISPRO 200 [IU]/ML
200 INJECTION, SOLUTION SUBCUTANEOUS
Qty: 2 | Refills: 1 | Status: ACTIVE | COMMUNITY
Start: 1900-01-01 | End: 1900-01-01

## 2024-04-15 RX ORDER — INSULIN GLARGINE 100 [IU]/ML
100 INJECTION, SOLUTION SUBCUTANEOUS
Qty: 5 | Refills: 1 | Status: ACTIVE | COMMUNITY
Start: 1900-01-01 | End: 1900-01-01

## 2024-04-15 RX ORDER — EMPAGLIFLOZIN 25 MG/1
25 TABLET, FILM COATED ORAL
Qty: 90 | Refills: 1 | Status: ACTIVE | COMMUNITY
Start: 1900-01-01 | End: 1900-01-01

## 2024-04-25 ENCOUNTER — APPOINTMENT (OUTPATIENT)
Dept: DERMATOLOGY | Facility: CLINIC | Age: 74
End: 2024-04-25

## 2024-08-22 ENCOUNTER — APPOINTMENT (OUTPATIENT)
Dept: INFECTIOUS DISEASE | Facility: CLINIC | Age: 74
End: 2024-08-22
Payer: COMMERCIAL

## 2024-08-22 VITALS
DIASTOLIC BLOOD PRESSURE: 60 MMHG | BODY MASS INDEX: 29.52 KG/M2 | HEART RATE: 72 BPM | HEIGHT: 74 IN | TEMPERATURE: 98.3 F | SYSTOLIC BLOOD PRESSURE: 128 MMHG | OXYGEN SATURATION: 95 % | WEIGHT: 230 LBS

## 2024-08-22 DIAGNOSIS — R50.9 FEVER, UNSPECIFIED: ICD-10-CM

## 2024-08-22 DIAGNOSIS — R53.82 CHRONIC FATIGUE, UNSPECIFIED: ICD-10-CM

## 2024-08-22 DIAGNOSIS — Z86.16 PERSONAL HISTORY OF COVID-19: ICD-10-CM

## 2024-08-22 DIAGNOSIS — Z11.4 ENCOUNTER FOR SCREENING FOR HUMAN IMMUNODEFICIENCY VIRUS [HIV]: ICD-10-CM

## 2024-08-22 DIAGNOSIS — Z72.820 SLEEP DEPRIVATION: ICD-10-CM

## 2024-08-22 DIAGNOSIS — R53.83 OTHER FATIGUE: ICD-10-CM

## 2024-08-22 PROCEDURE — 99215 OFFICE O/P EST HI 40 MIN: CPT

## 2024-08-22 NOTE — PHYSICAL EXAM
[General Appearance - Alert] : alert [General Appearance - In No Acute Distress] : in no acute distress [Sclera] : the sclera and conjunctiva were normal [PERRL With Normal Accommodation] : pupils were equal in size, round, reactive to light [Extraocular Movements] : extraocular movements were intact [Outer Ear] : the ears and nose were normal in appearance [Oropharynx] : the oropharynx was normal with no thrush [Neck Appearance] : the appearance of the neck was normal [Neck Cervical Mass (___cm)] : no neck mass was observed [Thyroid Diffuse Enlargement] : the thyroid was not enlarged [Jugular Venous Distention Increased] : there was no jugular-venous distention [Auscultation Breath Sounds / Voice Sounds] : lungs were clear to auscultation bilaterally [Heart Rate And Rhythm] : heart rate was normal and rhythm regular [Heart Sounds] : normal S1 and S2 [Heart Sounds Gallop] : no gallops [Murmurs] : no murmurs [Heart Sounds Pericardial Friction Rub] : no pericardial rub [Full Pulse] : the pedal pulses are present [Edema] : there was no peripheral edema [Bowel Sounds] : normal bowel sounds [Abdomen Soft] : soft [Abdomen Tenderness] : non-tender [Abdomen Mass (___ Cm)] : no abdominal mass palpated [Costovertebral Angle Tenderness] : no CVA tenderness [No Palpable Adenopathy] : no palpable adenopathy [Musculoskeletal - Swelling] : no joint swelling [Nail Clubbing] : no clubbing  or cyanosis of the fingernails [Motor Tone] : muscle strength and tone were normal [Skin Color & Pigmentation] : normal skin color and pigmentation [] : no rash [Cranial Nerves] : cranial nerves 2-12 were intact [Sensation] : the sensory exam was normal to light touch and pinprick [No Focal Deficits] : no focal deficits [Oriented To Time, Place, And Person] : oriented to person, place, and time [Affect] : the affect was normal [FreeTextEntry1] : circular depression in tip of nose from prior skin cancer surgery

## 2024-08-22 NOTE — ASSESSMENT
[FreeTextEntry1] : This 74-year-old gentleman, previously seen in office for eft ear infection/otitis externa which grew out susceptible Pseudomonas and also Klebsiella oxytoca..  He was treated with course of IV antibiotics followed by oral Levaquin and discharged to the community.  Since the last office visit, he has been diagnosed with COVID for sometimes.  After that he still reports some brain fog, poor sleep, where he sleeps about 2 to 3 hours at a time.  He has some breathing concerns 2.  He has a history of smoking but has stopped.  He is not sure was related to the aging process, or if he has long COVID.  He has been diagnosed with sleep apnea for some time, and is consistently using his CPAP machine. He follows a [facility] physician for his sleep apnea.  He reports that he last had COVID around [place] time of last year.  This was diagnosed at an urgent care center.  He self-reports some low-grade fevers, is no chills, he occasionally has some rigors, he has no dysuria, no urinary symptoms.  On exam, he has no evidence of active infection.  Will check some labs ordered today: CBC CMP ESR CRP.  Blood culture x 1 set has been ordered. will call with results. Will screen the patient for HIV as well.  I explained to the patient that he could have post-COVID syndrome.  If his infection workup is negative, we can potentially start a low-dose steroids for a short amount of time.

## 2024-08-22 NOTE — PHYSICAL EXAM
[General Appearance - Alert] : alert [General Appearance - In No Acute Distress] : in no acute distress [PERRL With Normal Accommodation] : pupils were equal in size, round, reactive to light [Sclera] : the sclera and conjunctiva were normal [Extraocular Movements] : extraocular movements were intact [Outer Ear] : the ears and nose were normal in appearance [Oropharynx] : the oropharynx was normal with no thrush [Neck Appearance] : the appearance of the neck was normal [Neck Cervical Mass (___cm)] : no neck mass was observed [Jugular Venous Distention Increased] : there was no jugular-venous distention [Thyroid Diffuse Enlargement] : the thyroid was not enlarged [Auscultation Breath Sounds / Voice Sounds] : lungs were clear to auscultation bilaterally [Heart Rate And Rhythm] : heart rate was normal and rhythm regular [Heart Sounds] : normal S1 and S2 [Heart Sounds Gallop] : no gallops [Murmurs] : no murmurs [Heart Sounds Pericardial Friction Rub] : no pericardial rub [Full Pulse] : the pedal pulses are present [Edema] : there was no peripheral edema [Bowel Sounds] : normal bowel sounds [Abdomen Soft] : soft [Abdomen Tenderness] : non-tender [Abdomen Mass (___ Cm)] : no abdominal mass palpated [Costovertebral Angle Tenderness] : no CVA tenderness [No Palpable Adenopathy] : no palpable adenopathy [Musculoskeletal - Swelling] : no joint swelling [Nail Clubbing] : no clubbing  or cyanosis of the fingernails [Motor Tone] : muscle strength and tone were normal [Skin Color & Pigmentation] : normal skin color and pigmentation [] : no rash [Cranial Nerves] : cranial nerves 2-12 were intact [Sensation] : the sensory exam was normal to light touch and pinprick [No Focal Deficits] : no focal deficits [Oriented To Time, Place, And Person] : oriented to person, place, and time [Affect] : the affect was normal [FreeTextEntry1] : circular depression in tip of nose from prior skin cancer surgery

## 2024-08-22 NOTE — HISTORY OF PRESENT ILLNESS
[FreeTextEntry1] : This 74-year-old gentleman, previously seen in office for eft ear infection/otitis externa which grew out susceptible Pseudomonas and also Klebsiella oxytoca..  He was treated with course of IV antibiotics followed by oral Levaquin and discharged to the community.  Since the last office visit, he has been diagnosed with COVID for sometimes.  After that he still reports some brain fog, poor sleep, where he sleeps about 2 to 3 hours at a time.  He has some breathing concerns 2.  He has a history of smoking but has stopped.  He is not sure was related to the aging process, or if he has long COVID.  He has been diagnosed with sleep apnea for some time, and is consistently using his CPAP machine. He follows a [facility] physician for his sleep apnea.  He reports that he last had COVID around [place] time of last year.  This was diagnosed at an urgent care center.  He self-reports some low-grade fevers, is no chills, he occasionally has some rigors, he has no dysuria, no urinary symptoms.

## 2024-08-22 NOTE — ASSESSMENT
[FreeTextEntry1] : This 74-year-old gentleman, previously seen in office for eft ear infection/otitis externa which grew out susceptible Pseudomonas and also Klebsiella oxytoca..  He was treated with course of IV antibiotics followed by oral Levaquin and discharged to the community.  Since the last office visit, he has been diagnosed with COVID for sometimes.  After that he still reports some brain fog, poor sleep, where he sleeps about 2 to 3 hours at a time.  He has some breathing concerns 2.  He has a history of smoking but has stopped.  He is not sure was related to the aging process, or if he has long COVID.  He has been diagnosed with sleep apnea for some time, and is consistently using his CPAP machine. He follows a [facility] physician for his sleep apnea.  He reports that he last had COVID around [place] time of last year.  This was diagnosed at an urgent care center.  He self-reports some low-grade fevers, is no chills, he occasionally has some rigors, he has no dysuria, no urinary symptoms.  On exam, he has no evidence of active infection.  Will check some labs ordered today: CBC CMP ESR CRP.  Blood culture x 1 set has been ordered. will call with results. Will screen the patient for HIV as well.  I explained to the patient that he could have post-COVID syndrome.  If his infection workup is negative, we can potentially start a low-dose steroids for a short amount of time.

## 2024-08-22 NOTE — HISTORY OF PRESENT ILLNESS
[FreeTextEntry1] : This 74-year-old gentleman, previously seen in office for eft ear infection/otitis externa which grew out susceptible Pseudomonas and also Klebsiella oxytoca..  He was treated with course of IV antibiotics followed by oral Levaquin and discharged to the community.  Since the last office visit, he has been diagnosed with COVID for sometimes.  After that he still reports some brain fog, poor sleep, where he sleeps about 2 to 3 hours at a time.  He has some breathing concerns 2.  He has a history of smoking but has stopped.  He is not sure was related to the aging process, or if he has long COVID.  He has been diagnosed with sleep apnea for some time, and is consistently using his CPAP machine. He follows a [facility] physician for his sleep apnea.  He reports that he last had COVID around [place] time of last year.  This was diagnosed at an urgent care center.  He self-reports some low-grade fevers, is no chills, he occasionally has some rigors, he has no dysuria, no urinary symptoms.

## 2024-09-18 ENCOUNTER — NON-APPOINTMENT (OUTPATIENT)
Age: 74
End: 2024-09-18

## 2024-10-03 ENCOUNTER — LABORATORY RESULT (OUTPATIENT)
Age: 74
End: 2024-10-03

## 2024-10-04 ENCOUNTER — RX RENEWAL (OUTPATIENT)
Age: 74
End: 2024-10-04

## 2024-10-04 ENCOUNTER — NON-APPOINTMENT (OUTPATIENT)
Age: 74
End: 2024-10-04

## 2024-10-07 ENCOUNTER — NON-APPOINTMENT (OUTPATIENT)
Age: 74
End: 2024-10-07

## 2024-10-22 ENCOUNTER — APPOINTMENT (OUTPATIENT)
Dept: ENDOCRINOLOGY | Facility: CLINIC | Age: 74
End: 2024-10-22
Payer: COMMERCIAL

## 2024-10-22 VITALS
OXYGEN SATURATION: 98 % | BODY MASS INDEX: 31.06 KG/M2 | HEIGHT: 74 IN | SYSTOLIC BLOOD PRESSURE: 120 MMHG | HEART RATE: 56 BPM | WEIGHT: 242 LBS | DIASTOLIC BLOOD PRESSURE: 72 MMHG

## 2024-10-22 DIAGNOSIS — E11.65 TYPE 2 DIABETES MELLITUS WITH HYPERGLYCEMIA: ICD-10-CM

## 2024-10-22 DIAGNOSIS — E03.9 HYPOTHYROIDISM, UNSPECIFIED: ICD-10-CM

## 2024-10-22 DIAGNOSIS — E78.5 HYPERLIPIDEMIA, UNSPECIFIED: ICD-10-CM

## 2024-10-22 LAB — GLUCOSE BLDC GLUCOMTR-MCNC: 144

## 2024-10-22 PROCEDURE — 99214 OFFICE O/P EST MOD 30 MIN: CPT

## 2024-10-22 PROCEDURE — 82962 GLUCOSE BLOOD TEST: CPT

## 2024-10-22 PROCEDURE — 95251 CONT GLUC MNTR ANALYSIS I&R: CPT

## 2025-01-13 ENCOUNTER — RX RENEWAL (OUTPATIENT)
Age: 75
End: 2025-01-13

## 2025-02-04 ENCOUNTER — APPOINTMENT (OUTPATIENT)
Dept: DERMATOLOGY | Facility: CLINIC | Age: 75
End: 2025-02-04
Payer: COMMERCIAL

## 2025-02-04 PROCEDURE — 10060 I&D ABSCESS SIMPLE/SINGLE: CPT

## 2025-02-04 PROCEDURE — 99204 OFFICE O/P NEW MOD 45 MIN: CPT | Mod: 25

## 2025-03-10 ENCOUNTER — RX RENEWAL (OUTPATIENT)
Age: 75
End: 2025-03-10

## 2025-03-26 ENCOUNTER — RX RENEWAL (OUTPATIENT)
Age: 75
End: 2025-03-26

## 2025-05-08 ENCOUNTER — LABORATORY RESULT (OUTPATIENT)
Age: 75
End: 2025-05-08

## 2025-06-02 ENCOUNTER — NON-APPOINTMENT (OUTPATIENT)
Age: 75
End: 2025-06-02

## 2025-06-03 ENCOUNTER — APPOINTMENT (OUTPATIENT)
Dept: ENDOCRINOLOGY | Facility: CLINIC | Age: 75
End: 2025-06-03
Payer: COMMERCIAL

## 2025-06-03 VITALS
DIASTOLIC BLOOD PRESSURE: 70 MMHG | OXYGEN SATURATION: 96 % | SYSTOLIC BLOOD PRESSURE: 128 MMHG | TEMPERATURE: 98 F | HEART RATE: 70 BPM | RESPIRATION RATE: 16 BRPM | BODY MASS INDEX: 31.57 KG/M2 | HEIGHT: 74 IN | WEIGHT: 246 LBS

## 2025-06-03 DIAGNOSIS — E78.5 HYPERLIPIDEMIA, UNSPECIFIED: ICD-10-CM

## 2025-06-03 DIAGNOSIS — E03.9 HYPOTHYROIDISM, UNSPECIFIED: ICD-10-CM

## 2025-06-03 DIAGNOSIS — I10 ESSENTIAL (PRIMARY) HYPERTENSION: ICD-10-CM

## 2025-06-03 DIAGNOSIS — E11.65 TYPE 2 DIABETES MELLITUS WITH HYPERGLYCEMIA: ICD-10-CM

## 2025-06-03 LAB — GLUCOSE BLDC GLUCOMTR-MCNC: 159

## 2025-06-03 PROCEDURE — 99214 OFFICE O/P EST MOD 30 MIN: CPT

## 2025-06-03 PROCEDURE — 82962 GLUCOSE BLOOD TEST: CPT

## 2025-06-03 PROCEDURE — 95251 CONT GLUC MNTR ANALYSIS I&R: CPT

## 2025-09-12 RX ORDER — BLOOD-GLUCOSE SENSOR
EACH MISCELLANEOUS
Qty: 9 | Refills: 3 | Status: ACTIVE | COMMUNITY
Start: 2025-09-12 | End: 1900-01-01